# Patient Record
Sex: FEMALE | Race: WHITE | Employment: STUDENT | URBAN - METROPOLITAN AREA
[De-identification: names, ages, dates, MRNs, and addresses within clinical notes are randomized per-mention and may not be internally consistent; named-entity substitution may affect disease eponyms.]

---

## 2019-05-13 ENCOUNTER — OFFICE VISIT (OUTPATIENT)
Dept: FAMILY MEDICINE CLINIC | Facility: CLINIC | Age: 19
End: 2019-05-13
Payer: COMMERCIAL

## 2019-05-13 VITALS
TEMPERATURE: 99 F | DIASTOLIC BLOOD PRESSURE: 70 MMHG | BODY MASS INDEX: 17.43 KG/M2 | SYSTOLIC BLOOD PRESSURE: 100 MMHG | HEIGHT: 68 IN | OXYGEN SATURATION: 99 % | HEART RATE: 102 BPM | WEIGHT: 115 LBS | RESPIRATION RATE: 20 BRPM

## 2019-05-13 DIAGNOSIS — R68.84 PAIN IN LOWER JAW: ICD-10-CM

## 2019-05-13 DIAGNOSIS — R22.0 JAW SWELLING: Primary | ICD-10-CM

## 2019-05-13 PROCEDURE — 99202 OFFICE O/P NEW SF 15 MIN: CPT | Performed by: NURSE PRACTITIONER

## 2019-05-13 RX ORDER — AMOXICILLIN AND CLAVULANATE POTASSIUM 875; 125 MG/1; MG/1
1 TABLET, FILM COATED ORAL 2 TIMES DAILY
Qty: 20 TABLET | Refills: 0 | Status: SHIPPED | OUTPATIENT
Start: 2019-05-13 | End: 2019-05-23

## 2019-05-13 RX ORDER — NORETHINDRONE ACETATE AND ETHINYL ESTRADIOL, ETHINYL ESTRADIOL AND FERROUS FUMARATE 1MG-10(24)
KIT ORAL
Refills: 2 | COMMUNITY
Start: 2019-03-02

## 2019-05-13 NOTE — PATIENT INSTRUCTIONS
GO TO ER FOR WORSENING SYMPTOMS  CALL DENTIST FOR FURTHER EVALUATION  SOFT DIET  TYLENOL OR IBUPROFEN FOR PAIN OR FEVER    Dental Abscess  An abscess is a sac of pus.  A dental abscess forms when a tooth or the tissue around it becomes infected with bacte You may need to be admitted to a hospital if the infection is severe, has spread, or doesn’t respond to treatment.     When to call the dentist  Call your dentist right away if you have any of the following:  · Fever of 100.4°F (38°C) or higher  · Increase · Take all of the antibiotic medicine exactly as directed until it is gone. Don’t miss any doses, especially during the first 7 days. Don’t stop taking it when your symptoms get better.   · Use a cool compress (face cloth soaked in cool water) on your face

## 2019-05-14 NOTE — PROGRESS NOTES
CHIEF COMPLAINT:     Patient presents with:  Swelling: redness. left side of face, s/s for 1 day. No OTC meds taken      HPI:   Sunny Cervantes is a 19year old female who presents with complaints of mild swelling to left side of face near jaw.   [place] NOSE: nostrils patent, no exudates, nasal mucosa pink and noninflamed  THROAT: oral mucosa pink, moist. No visible dental caries. No gum swelling or signs of abscess. No tenderness to teeth. Posterior pharynx is not erythematous. no exudates.   NECK: supp · Antibiotic medicines. These treat the underlying infection. · Pain relievers. These help you feel more comfortable. Your healthcare provider may prescribe a medicine for you.  Or you may use over-the-counter pain relievers, such as acetaminophen or ibupr Cellulitis is an infection of the deep layers of skin. A break in the skin, such as a cut or scratch, can let bacteria under the skin. It may also occur from an infected oil gland (pimple) or hair follicle.  If the bacteria get to deep layers of the skin, i · Headache or neck pain that gets worse  · Unusual drowsiness or confusion  · Convulsions (seizure)  · Change in eyesight     Date Last Reviewed: 9/1/2016  © 5916-0371 [employer]. [address], [address] [place] [address].  All rights reserve